# Patient Record
Sex: FEMALE | URBAN - METROPOLITAN AREA
[De-identification: names, ages, dates, MRNs, and addresses within clinical notes are randomized per-mention and may not be internally consistent; named-entity substitution may affect disease eponyms.]

---
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
## 2020-01-17 ENCOUNTER — OFFICE VISIT (OUTPATIENT)
Dept: URBAN - METROPOLITAN AREA CLINIC 58 | Facility: CLINIC | Age: 20
End: 2020-01-17

## 2020-01-17 PROCEDURE — 92310 CONTACT LENS FITTING OU: CPT | Performed by: OPTOMETRIST

## 2020-05-08 ENCOUNTER — OFFICE VISIT (OUTPATIENT)
Dept: URBAN - METROPOLITAN AREA CLINIC 58 | Facility: CLINIC | Age: 20
End: 2020-05-08
Payer: COMMERCIAL

## 2020-05-08 PROCEDURE — 92310 CONTACT LENS FITTING OU: CPT | Performed by: OPTOMETRIST

## 2020-05-08 PROCEDURE — 92014 COMPRE OPH EXAM EST PT 1/>: CPT | Performed by: OPTOMETRIST

## 2020-05-08 ASSESSMENT — KERATOMETRY
OD: 44.00
OS: 43.88

## 2020-05-08 ASSESSMENT — VISUAL ACUITY
OS: 20/20
OD: 20/20

## 2020-05-08 ASSESSMENT — INTRAOCULAR PRESSURE
OD: 12
OS: 10

## 2020-05-08 NOTE — IMPRESSION/PLAN
Impression: Myopia, bilateral: H52.13. Plan: SRx monitor  yearly, pt edu. pt to rtc if any problems  with glasses. Order trial for contact lens, darin vision clearsight 1 day.

## 2020-05-21 ENCOUNTER — TESTING ONLY (OUTPATIENT)
Dept: URBAN - METROPOLITAN AREA CLINIC 58 | Facility: CLINIC | Age: 20
End: 2020-05-21

## 2020-05-21 DIAGNOSIS — H52.13 MYOPIA, BILATERAL: Primary | ICD-10-CM

## 2020-05-21 PROCEDURE — 92310 CONTACT LENS FITTING OU: CPT | Performed by: OPTOMETRIST

## 2020-05-21 NOTE — IMPRESSION/PLAN
Impression: Myopia, bilateral: H52.13. Plan: Pt trying precision 1, but boxes of av moist sent home to try since pt was wondering if she previously had a bad box. OD moves a little more than what I'd like, but pt says comfort is pretty good, although she can feel OD  a little.

## 2020-05-29 ENCOUNTER — TESTING ONLY (OUTPATIENT)
Dept: URBAN - METROPOLITAN AREA CLINIC 58 | Facility: CLINIC | Age: 20
End: 2020-05-29

## 2020-05-29 PROCEDURE — 92310 CONTACT LENS FITTING OU: CPT | Performed by: OPTOMETRIST
